# Patient Record
Sex: MALE | Race: WHITE | Employment: PART TIME | URBAN - METROPOLITAN AREA
[De-identification: names, ages, dates, MRNs, and addresses within clinical notes are randomized per-mention and may not be internally consistent; named-entity substitution may affect disease eponyms.]

---

## 2022-11-22 ENCOUNTER — HOSPITAL ENCOUNTER (OUTPATIENT)
Age: 57
Discharge: HOME | End: 2022-11-22
Payer: COMMERCIAL

## 2022-11-22 ENCOUNTER — HOSPITAL ENCOUNTER (OUTPATIENT)
Dept: GENERAL RADIOLOGY | Age: 57
Discharge: HOME | End: 2022-11-22
Payer: COMMERCIAL

## 2022-11-22 DIAGNOSIS — Z00.00 ROUTINE GENERAL MEDICAL EXAMINATION AT A HEALTH CARE FACILITY: ICD-10-CM

## 2022-11-22 PROCEDURE — 71046 X-RAY EXAM CHEST 2 VIEWS: CPT

## 2022-11-30 ENCOUNTER — APPOINTMENT (OUTPATIENT)
Dept: GENERAL RADIOLOGY | Age: 57
End: 2022-11-30
Payer: COMMERCIAL

## 2022-11-30 ENCOUNTER — HOSPITAL ENCOUNTER (EMERGENCY)
Age: 57
Discharge: HOME | End: 2022-12-01
Attending: EMERGENCY MEDICINE
Payer: COMMERCIAL

## 2022-11-30 ENCOUNTER — APPOINTMENT (OUTPATIENT)
Dept: CT IMAGING | Age: 57
End: 2022-11-30
Payer: COMMERCIAL

## 2022-11-30 VITALS
BODY MASS INDEX: 22.19 KG/M2 | HEART RATE: 82 BPM | TEMPERATURE: 97.6 F | HEIGHT: 71 IN | DIASTOLIC BLOOD PRESSURE: 88 MMHG | RESPIRATION RATE: 16 BRPM | SYSTOLIC BLOOD PRESSURE: 160 MMHG | WEIGHT: 158.5 LBS | OXYGEN SATURATION: 96 %

## 2022-11-30 DIAGNOSIS — S76.111A QUADRICEPS TENDON RUPTURE, RIGHT, INITIAL ENCOUNTER: Primary | ICD-10-CM

## 2022-11-30 PROCEDURE — 99284 EMERGENCY DEPT VISIT MOD MDM: CPT

## 2022-11-30 PROCEDURE — 73700 CT LOWER EXTREMITY W/O DYE: CPT

## 2022-11-30 PROCEDURE — 6370000000 HC RX 637 (ALT 250 FOR IP): Performed by: EMERGENCY MEDICINE

## 2022-11-30 PROCEDURE — 73564 X-RAY EXAM KNEE 4 OR MORE: CPT

## 2022-11-30 RX ORDER — ASPIRIN 81 MG/1
81 TABLET ORAL DAILY
COMMUNITY

## 2022-11-30 RX ORDER — PANTOPRAZOLE SODIUM 40 MG/1
40 TABLET, DELAYED RELEASE ORAL DAILY
COMMUNITY

## 2022-11-30 RX ORDER — HYDROCODONE BITARTRATE AND ACETAMINOPHEN 5; 325 MG/1; MG/1
2 TABLET ORAL ONCE
Status: COMPLETED | OUTPATIENT
Start: 2022-11-30 | End: 2022-11-30

## 2022-11-30 RX ORDER — LORATADINE 10 MG/1
10 CAPSULE, LIQUID FILLED ORAL DAILY
COMMUNITY

## 2022-11-30 RX ORDER — DULAGLUTIDE 1.5 MG/.5ML
1.5 INJECTION, SOLUTION SUBCUTANEOUS WEEKLY
COMMUNITY

## 2022-11-30 RX ORDER — LISINOPRIL 20 MG/1
20 TABLET ORAL DAILY
COMMUNITY

## 2022-11-30 RX ORDER — METOPROLOL SUCCINATE 50 MG/1
50 TABLET, EXTENDED RELEASE ORAL DAILY
COMMUNITY

## 2022-11-30 RX ORDER — TAMSULOSIN HYDROCHLORIDE 0.4 MG/1
0.4 CAPSULE ORAL DAILY
COMMUNITY

## 2022-11-30 RX ADMIN — HYDROCODONE BITARTRATE AND ACETAMINOPHEN 2 TABLET: 5; 325 TABLET ORAL at 23:14

## 2022-12-01 RX ORDER — HYDROCODONE BITARTRATE AND ACETAMINOPHEN 5; 325 MG/1; MG/1
1-2 TABLET ORAL EVERY 8 HOURS PRN
Qty: 9 TABLET | Refills: 0 | Status: SHIPPED | OUTPATIENT
Start: 2022-12-01 | End: 2022-12-04

## 2022-12-01 RX ORDER — TIZANIDINE 4 MG/1
4 TABLET ORAL EVERY 8 HOURS PRN
Qty: 9 TABLET | Refills: 0 | Status: SHIPPED | OUTPATIENT
Start: 2022-12-01 | End: 2022-12-04

## 2022-12-01 ASSESSMENT — ENCOUNTER SYMPTOMS
GASTROINTESTINAL NEGATIVE: 1
EYES NEGATIVE: 1
RESPIRATORY NEGATIVE: 1

## 2022-12-01 NOTE — DISCHARGE INSTRUCTIONS
Very Important to follow up with orthopedics at [facility]. Definitive management will be determined by your choice of follow up. This may include surgical intervention but this or other options will be determined after follow up.  They will manage and possible get additional imaging

## 2022-12-01 NOTE — ED NOTES
This nurse, Nelida Chowdhury ,Jaime Love RN, and Dr Estefany Love responded to call for assistance in CT. Pt sitting on floor. Denies hitting head. Denies other injury @ this time. Examined by physician no new injuries found. Per CT tech pt R leg gave out while pivoting to CT table for exam. Pt assisted to standing position and assisted with pivoting to CT table.       Vandana Porter RN  11/30/22 604 [address] [place] [address], RN  12/01/22 0000

## 2022-12-01 NOTE — ED PROVIDER NOTES
The history is provided by the patient. Knee Problem  Patient lost his balance and was stumbling forward the patient planted his right leg and twisted he then fell immediately to the floor. There was immediate swelling in the area EMS was activated patient was brought to the emergency department. He did not strike his head there was no loss of consciousness and the patient did not have any other injuries. Review of Systems   Constitutional: Negative. HENT: Negative. Eyes: Negative. Respiratory: Negative. Cardiovascular: Negative. Gastrointestinal: Negative. Genitourinary: Negative. Musculoskeletal: Negative. Skin: Negative. Neurological: Negative. All other systems reviewed and are negative. History reviewed. No pertinent family history. Social History     Socioeconomic History    Marital status:      Spouse name: Not on file    Number of children: Not on file    Years of education: Not on file    Highest education level: Not on file   Occupational History    Not on file   Tobacco Use    Smoking status: Former     Types: Cigarettes    Smokeless tobacco: Never   Substance and Sexual Activity    Alcohol use: Yes    Drug use: Never    Sexual activity: Not on file   Other Topics Concern    Not on file   Social History Narrative    Not on file     Social Determinants of Health     Financial Resource Strain: Not on file   Food Insecurity: Not on file   Transportation Needs: Not on file   Physical Activity: Not on file   Stress: Not on file   Social Connections: Not on file   Intimate Partner Violence: Not on file   Housing Stability: Not on file     History reviewed. No pertinent surgical history. Past Medical History:   Diagnosis Date    Diabetes mellitus ([facility] [address]     Hyperlipidemia     Hypertension      No Known Allergies  Prior to Admission medications    Medication Sig Start Date End Date Taking?  Authorizing Provider   HYDROcodone-acetaminophen (NORCO) 5-325 MG per tablet Take 1-2 tablets by mouth every 8 hours as needed for Pain for up to 3 days. 12/1/22 12/4/22 Yes Dana Villalobos DO   tiZANidine (ZANAFLEX) 4 MG tablet Take 1 tablet by mouth every 8 hours as needed (spasm) 12/1/22 12/4/22 Yes Dana Villalobos, DO   metoprolol succinate (TOPROL XL) 50 MG extended release tablet Take 50 mg by mouth daily   Yes Historical Provider, MD   aspirin 81 MG EC tablet Take 81 mg by mouth daily   Yes Historical Provider, MD   tamsulosin (FLOMAX) 0.4 MG capsule Take 0.4 mg by mouth daily   Yes Historical Provider, MD   lisinopril (PRINIVIL;ZESTRIL) 20 MG tablet Take 20 mg by mouth daily   Yes Historical Provider, MD   pantoprazole (PROTONIX) 40 MG tablet Take 40 mg by mouth daily   Yes Historical Provider, MD   loratadine (CLARITIN) 10 MG capsule Take 10 mg by mouth daily   Yes Historical Provider, MD   dulaglutide (TRULICITY) 1.5 AD/5.4RO SC injection Inject 1.5 mg into the skin once a week   Yes Historical Provider, MD   metFORMIN (GLUCOPHAGE) 500 MG tablet Take 500 mg by mouth 2 times daily (with meals)   Yes Historical Provider, MD       BP (!) 160/88   Pulse 82   Temp 97.6 °F (36.4 °C) (Infrared)   Resp 16   Ht 5' 11\" (1.803 m)   Wt 158 lb 8 oz (71.9 kg)   SpO2 96%   BMI 22.11 kg/m²     Physical Exam  Vitals and nursing note reviewed. Constitutional:       Appearance: Normal appearance. HENT:      Head: Normocephalic and atraumatic. Nose: Nose normal.      Mouth/Throat:      Mouth: Mucous membranes are dry. Eyes:      Extraocular Movements: Extraocular movements intact. Pupils: Pupils are equal, round, and reactive to light. Cardiovascular:      Rate and Rhythm: Normal rate. Pulmonary:      Effort: Pulmonary effort is normal.      Breath sounds: Normal breath sounds. Abdominal:      General: Abdomen is flat. Palpations: Abdomen is soft.    Musculoskeletal:        Legs:       Comments: Large palpable deformity and soft tissue swelling can be easily identified on the patient's right knee. The patient has a palpable deformity between his patella and the quadricep musculature with associated surrounding soft tissues swelling. There is also small joint effusion which is palpable on physical examination distal patellar ligament appears to be intact the patella also appears to be intact. There is some tenderness along the hamstring musculature of the same leg particularly the biceps femoris and biceps tendinosis   Neurological:      Mental Status: He is alert. MDM:    Labs Reviewed - No data to display    XR KNEE RIGHT (MIN 4 VIEWS)   Final Result   No fracture         CT KNEE RIGHT WO CONTRAST    (Results Pending)      X rays of knee as interpreted by radiology was read as non acute. Patient has obvious deformity between the quadriceps and the patella I had high clinical suspicion that the patient had a quadricep tendons rupture and even though the x-ray is negative I obtained this in order to rule out the fact that he had some kind of a patellar fracture or distal femur fracture. CT scan without IV contrast was also obtained and as interpreted by radiology shows a quadricep partial rupture. There was significant amount of soft tissue swelling as well as small joint effusion. The official diagnosis did not appear within the CT scan report because the radiologist that had originally read the wet read needed to have another radiologist over read it that specialized in this area and this would not be done until morning. The radiologist called me and gave me the diagnosis of the initial wet read on the phone and even though this does not appear in the official chart as of yet the diagnosis of the partial quadricep tear was made. Patient was given Delford Loges in the emergency department and I have given the patient Vicodin 500/5 by mouth to help control pain as well as Zanaflex 4 mg by mouth in order to help control muscle spasms.   Patient was placed in knee immobilizer and crutches. I have given the patient follow-up information for orthopedist that is on-call but the patient normally gets his care at [facility] through [facility] and he has declined to follow-up with any outpatient orthopedist that his civilian he is going to go through [facility] in [facility]. I gave the patient to follow-up information he can use this as he sees fit. I am also not going to call and discussed the patient's case with our orthopedist on-call at 12:30 at night especially if they are not going to follow-up. They were given the follow-up call numbers as appropriate. Prescriptions were also given to the patient as noted above. Patient received current education prior to discharge. The patient knee immobilizer was placed extremity is neurovascularly intact. Patient was told that he should remain nonweightbearing with a knee immobilizer intact until follow-up can be arranged through his system  Addendum: While the patient was in radiology he was asked to plan his good leg and then pivot onto the CT scanner but his leg \"gave out\" patient then fell onto the floor. He did not suffer any other injuries he did not strike his head there was no loss of consciousness. Patient was assisted to standing position did not have any other injuries did not suffer any other injuries and CAT scan was obtained patient was then returned to room. My typical dicussion, presentation, and considerations for this patients' chief complaint, diagnosis, differential diagnosis, medications, medication use,  medication safety and medication interactions have been explained and outlined to this patient for this patient encounter. I have stressed need for follow up and reexamination for this encounter      Final Impression    1.  Quadriceps tendon rupture, right, initial encounter              Davis Luis DO  12/01/22 [address] [place] [address], DO  12/01/22 2133

## 2022-12-01 NOTE — ED NOTES
Pt provided urinal for voiding needs and assisted with repositioning.      Reese Thomas RN  12/01/22 [id number]

## 2022-12-01 NOTE — ED NOTES
Pt and wife verbalized understanding of discharge medications/side effects and follow-up care/instructions, denies any questions or concerns at this time. Pt provided with 2 prescriptions; pt encouraged to return to the ED for any new or worsening symptoms.      Di Roe RN  12/01/22 0030